# Patient Record
Sex: FEMALE
[De-identification: names, ages, dates, MRNs, and addresses within clinical notes are randomized per-mention and may not be internally consistent; named-entity substitution may affect disease eponyms.]

---

## 2020-01-23 PROBLEM — Z00.00 ENCOUNTER FOR PREVENTIVE HEALTH EXAMINATION: Status: ACTIVE | Noted: 2020-01-23

## 2020-03-11 ENCOUNTER — APPOINTMENT (OUTPATIENT)
Dept: RHEUMATOLOGY | Facility: CLINIC | Age: 55
End: 2020-03-11

## 2021-03-17 ENCOUNTER — APPOINTMENT (OUTPATIENT)
Dept: RHEUMATOLOGY | Facility: CLINIC | Age: 56
End: 2021-03-17
Payer: COMMERCIAL

## 2021-03-17 DIAGNOSIS — M81.0 AGE-RELATED OSTEOPOROSIS W/OUT CURRENT PATHOLOGICAL FRACTURE: ICD-10-CM

## 2021-03-17 PROCEDURE — 99214 OFFICE O/P EST MOD 30 MIN: CPT | Mod: 95

## 2021-03-17 NOTE — REASON FOR VISIT
[Follow-Up: _____] : a [unfilled] follow-up visit [Home] : at home, [unfilled] , at the time of the visit. [Medical Office: ([facility])___] : at the medical office located in  [Verbal consent obtained from patient] : the patient, [unfilled] [FreeTextEntry1] : osteopororisis / arm pain

## 2021-03-17 NOTE — PHYSICAL EXAM
[General Appearance - Alert] : alert [General Appearance - In No Acute Distress] : in no acute distress [General Appearance - Well Nourished] : well nourished [General Appearance - Well Developed] : well developed [Musculoskeletal - Swelling] : no joint swelling seen [FreeTextEntry1] : Visible joints and range of motion upper extremity appear normal

## 2021-03-17 NOTE — ASSESSMENT
[FreeTextEntry1] : 55-year-old woman history of osteoporosis has been on alendronate for 2 years does describe some musculoskeletal complaints including burning pains in arms weakness of wrists joint discomforts as well as knee stiffness after sitting for periods of time only lasting a few minutes I did explain to patient as I do not think this is related to her use of alendronate and would continue for at least 1 more year I am updating a bone density I am updating blood work done I plan to review this with patient when they return

## 2021-03-17 NOTE — HISTORY OF PRESENT ILLNESS
[Home] : at home, [unfilled] , at the time of the visit. [Medical Office: ([facility])___] : at the medical office located in  [Verbal consent obtained from patient] : the patient, [unfilled] [FreeTextEntry1] : This is a 55-year-old woman's not been seen by me in approximately 2 years 2 years ago she was found on routine bone density to have osteoporosis she was started on alendronate which she takes once a week she apparently tolerates this well but she is wondering whether it could be contributing to some musculoskeletal complaints that she has had she has been on it for about 2 years she has had no dental issues she did have fractures in 2012 she had foot fractures but no subsequent fractures she noticed for the past few months starting in October 2020 burning pain in her right arm palmar surface of her right arm which extended from the elbow to the wrist she also felt that she had some decreased strength there she is right-handed this burning and shooting pain has resolved but she is now been having some left arm pain again some pain in the arm as well as weakness in the left hand difficulty encouraging packages she does have good range of motion of her wrists her elbows and her shoulders she also reports some stiffness in her knees she reports it is worse in the morning it lasts a few minutes is also worse after sitting for periods of time there has been no swelling she is uncertain as to how long these knee stiffness and swelling has been going on for-she has no other significant issues she has not had any blood work in about 2 years no thyroid studies no other bloods

## 2021-03-17 NOTE — REVIEW OF SYSTEMS
[Arthralgias] : arthralgias [Joint Pain] : joint pain [Joint Stiffness] : joint stiffness [Limb Pain] : limb pain [Limb Weakness] : limb weakness [Fever] : no fever [Chills] : no chills [Feeling Poorly] : not feeling poorly [Feeling Tired] : not feeling tired [Dry Eyes] : no dryness of the eyes [Heartburn] : no heartburn [Joint Swelling] : no joint swelling [Itching] : no itching [FreeTextEntry7] : No reflux

## 2021-03-18 ENCOUNTER — NON-APPOINTMENT (OUTPATIENT)
Age: 56
End: 2021-03-18

## 2021-03-18 DIAGNOSIS — M25.50 PAIN IN UNSPECIFIED JOINT: ICD-10-CM

## 2021-04-01 ENCOUNTER — NON-APPOINTMENT (OUTPATIENT)
Age: 56
End: 2021-04-01

## 2021-08-23 RX ORDER — ALENDRONATE SODIUM 70 MG/1
70 TABLET ORAL
Qty: 12 | Refills: 3 | Status: ACTIVE | COMMUNITY
Start: 2021-08-23 | End: 1900-01-01